# Patient Record
(demographics unavailable — no encounter records)

---

## 2025-07-29 NOTE — PHYSICAL EXAM
[de-identified] : General Appearance / Station: Well developed, well nourished, in no acute distress   Orientation: Oriented to person, place, and time   Gait & Station: Ambulates with assistive device   Neurologic: Normal leg sensation   Cardiovascular: Warm extremity   Lymphatics: No lymphedema      OPERATIVE HIP   Skin: incision clean, dry and intact. Well healing. Wound cleaned staples removed.  Steri-Strips applied. No erythema, warmth or tenderness.   Range of motion: Painless internal and external rotation of the hip.   Strength: Within Normal Limits. Negative Trendelenburg sign                                                                        Neurovascular Exam: Able to wiggle toes and dorsiflex/ plantarflex ankle. Foot warm, well perfused    [de-identified] : Imaging: AP Pelvis and lateral views of the right hip show satisfactory placement of a percutaneous screws. There are no changes in alignment of hardware and no signs of radiographic failure compared to previous radiographs.

## 2025-07-29 NOTE — HISTORY OF PRESENT ILLNESS
[de-identified] : KULDEEP KNOX  is an 77 year-old female presents today status post on Right hip closed reduction, percutaneous pinning of femoral neck fracture on 7/2/25 for followup. The patient is living at home. The patient has maintained weight bearing as tolerated. The patient is ambulating with an assistive device and working with physical therapy. The patient has weaned off all narcotic pain medicine. The patient is progressing well and is happy with the progress.     No fever, chills, or signs of infection. Today, patient does not state any other associated signs or complaints outside of those described. The patient presents for postoperative followup.

## 2025-07-29 NOTE — DISCUSSION/SUMMARY
[de-identified] : Now s/p or follow-up Right hip closed reduction, percutaneous pinning of femoral neck fracture on 7/2/25. Overall doing well.       At this point we have suggested continued exercises and continued physical therapy appropriate instructions regarding further care, restrictions, and further recovery has been given. They know to complete the prescribed four weeks of DVT prophylaxis.  Follow-up for 3-month appointment  We have also counseled the patient to avoid any dental procedures for the first three months postoperatively. We remain available for any further questions and concerns and instructed patient that we would like to see them if any concerns for infection including fevers, redness, or increased swelling.